# Patient Record
Sex: OTHER/UNKNOWN | Race: WHITE | NOT HISPANIC OR LATINO | Employment: FULL TIME | URBAN - METROPOLITAN AREA
[De-identification: names, ages, dates, MRNs, and addresses within clinical notes are randomized per-mention and may not be internally consistent; named-entity substitution may affect disease eponyms.]

---

## 2023-03-09 ENCOUNTER — TRANSFERRED RECORDS (OUTPATIENT)
Dept: HEALTH INFORMATION MANAGEMENT | Facility: CLINIC | Age: 33
End: 2023-03-09

## 2023-03-09 LAB — PHQ9 SCORE: 1

## 2023-07-13 ENCOUNTER — TRANSFERRED RECORDS (OUTPATIENT)
Dept: HEALTH INFORMATION MANAGEMENT | Facility: CLINIC | Age: 33
End: 2023-07-13

## 2024-02-07 ENCOUNTER — TRANSFERRED RECORDS (OUTPATIENT)
Dept: HEALTH INFORMATION MANAGEMENT | Facility: CLINIC | Age: 34
End: 2024-02-07

## 2024-03-27 ENCOUNTER — OFFICE VISIT (OUTPATIENT)
Dept: FAMILY MEDICINE | Facility: CLINIC | Age: 34
End: 2024-03-27

## 2024-03-27 VITALS
HEIGHT: 67 IN | BODY MASS INDEX: 26.37 KG/M2 | WEIGHT: 168 LBS | HEART RATE: 70 BPM | OXYGEN SATURATION: 97 % | DIASTOLIC BLOOD PRESSURE: 97 MMHG | TEMPERATURE: 98 F | RESPIRATION RATE: 16 BRPM | SYSTOLIC BLOOD PRESSURE: 143 MMHG

## 2024-03-27 DIAGNOSIS — F31.81 BIPOLAR II DISORDER (H): ICD-10-CM

## 2024-03-27 DIAGNOSIS — Z29.81 ENCOUNTER FOR PRE-EXPOSURE PROPHYLAXIS FOR HIV: ICD-10-CM

## 2024-03-27 DIAGNOSIS — F31.81 BIPOLAR II DISORDER (H): Primary | ICD-10-CM

## 2024-03-27 DIAGNOSIS — F90.9 ATTENTION DEFICIT HYPERACTIVITY DISORDER (ADHD), UNSPECIFIED ADHD TYPE: ICD-10-CM

## 2024-03-27 DIAGNOSIS — F90.9 ATTENTION DEFICIT HYPERACTIVITY DISORDER (ADHD), UNSPECIFIED ADHD TYPE: Primary | ICD-10-CM

## 2024-03-27 DIAGNOSIS — Z11.4 SCREENING FOR HUMAN IMMUNODEFICIENCY VIRUS: ICD-10-CM

## 2024-03-27 DIAGNOSIS — Z76.89 ENCOUNTER TO ESTABLISH CARE: ICD-10-CM

## 2024-03-27 LAB
ANION GAP SERPL CALCULATED.3IONS-SCNC: 10 MMOL/L (ref 7–15)
BUN SERPL-MCNC: 11.6 MG/DL (ref 6–20)
CALCIUM SERPL-MCNC: 9.4 MG/DL (ref 8.6–10)
CHLORIDE SERPL-SCNC: 102 MMOL/L (ref 98–107)
CHOLEST SERPL-MCNC: 207 MG/DL
CREAT SERPL-MCNC: 0.99 MG/DL (ref 0.51–1.17)
DEPRECATED HCO3 PLAS-SCNC: 27 MMOL/L (ref 22–29)
EGFRCR SERPLBLD CKD-EPI 2021: NORMAL ML/MIN/{1.73_M2}
FASTING STATUS PATIENT QL REPORTED: ABNORMAL
GLUCOSE SERPL-MCNC: 97 MG/DL (ref 70–99)
HCV AB SERPL QL IA: NONREACTIVE
HDLC SERPL-MCNC: 48 MG/DL
HIV 1+2 AB+HIV1 P24 AG SERPL QL IA: NONREACTIVE
LDLC SERPL CALC-MCNC: 116 MG/DL
NONHDLC SERPL-MCNC: 159 MG/DL
POTASSIUM SERPL-SCNC: 4.4 MMOL/L (ref 3.4–5.3)
SODIUM SERPL-SCNC: 139 MMOL/L (ref 135–145)
T PALLIDUM AB SER QL: REACTIVE
TRIGL SERPL-MCNC: 217 MG/DL

## 2024-03-27 PROCEDURE — 87491 CHLMYD TRACH DNA AMP PROBE: CPT

## 2024-03-27 PROCEDURE — 86592 SYPHILIS TEST NON-TREP QUAL: CPT

## 2024-03-27 PROCEDURE — 36415 COLL VENOUS BLD VENIPUNCTURE: CPT

## 2024-03-27 PROCEDURE — 86780 TREPONEMA PALLIDUM: CPT | Mod: 90

## 2024-03-27 PROCEDURE — 86803 HEPATITIS C AB TEST: CPT

## 2024-03-27 PROCEDURE — 87591 N.GONORRHOEAE DNA AMP PROB: CPT

## 2024-03-27 PROCEDURE — 80048 BASIC METABOLIC PNL TOTAL CA: CPT

## 2024-03-27 PROCEDURE — 80061 LIPID PANEL: CPT

## 2024-03-27 PROCEDURE — 99204 OFFICE O/P NEW MOD 45 MIN: CPT | Mod: GC

## 2024-03-27 PROCEDURE — 87389 HIV-1 AG W/HIV-1&-2 AB AG IA: CPT

## 2024-03-27 PROCEDURE — 99000 SPECIMEN HANDLING OFFICE-LAB: CPT

## 2024-03-27 RX ORDER — METHYLPHENIDATE HYDROCHLORIDE 20 MG/1
20 TABLET ORAL 2 TIMES DAILY
Qty: 28 TABLET | Refills: 0 | Status: CANCELLED | OUTPATIENT
Start: 2024-03-27 | End: 2024-04-10

## 2024-03-27 RX ORDER — METHYLPHENIDATE HYDROCHLORIDE 20 MG/1
20 TABLET ORAL 2 TIMES DAILY
Qty: 10 TABLET | Refills: 0 | Status: SHIPPED | OUTPATIENT
Start: 2024-03-27 | End: 2024-04-19

## 2024-03-27 RX ORDER — LAMOTRIGINE 150 MG/1
150 TABLET ORAL DAILY
Qty: 90 TABLET | Refills: 3 | Status: SHIPPED | OUTPATIENT
Start: 2024-03-27 | End: 2024-04-18

## 2024-03-27 RX ORDER — METHYLPHENIDATE HYDROCHLORIDE 20 MG/1
20 TABLET ORAL 2 TIMES DAILY
Qty: 60 TABLET | Refills: 11 | Status: CANCELLED | OUTPATIENT
Start: 2024-03-27

## 2024-03-27 RX ORDER — EMTRICITABINE AND TENOFOVIR DISOPROXIL FUMARATE 200; 300 MG/1; MG/1
1 TABLET, FILM COATED ORAL DAILY
Qty: 90 TABLET | Refills: 3 | Status: SHIPPED | OUTPATIENT
Start: 2024-03-27 | End: 2024-03-27

## 2024-03-27 NOTE — PROGRESS NOTES
Preceptor Attestation:    I discussed the patient with the resident and evaluated the patient in person. I have verified the content of the note, which accurately reflects my assessment of the patient and the plan of care.   Supervising Physician:  Marcela Nguyen MD

## 2024-03-27 NOTE — PROGRESS NOTES
"  Assessment & Plan     Encounter to establish care  Will obtain vaccine records     Attention deficit hyperactivity disorder (ADHD), unspecified ADHD type  Patient has established ADHD diagnosis with history of neuropsych testing. Obtained a release of records.   - methylphenidate (RITALIN) 20 MG tablet BID    Bipolar II disorder  Stable on Lamotrigine 150mg daily for the past 5-6 years     Screening for human immunodeficiency virus  - Basic metabolic panel  - Hepatitis C antibody  - Treponema Abs w Reflex to RPR and Titer  - Lipid Profile  - HIV Antigen Antibody Combo Cascade  - Chlamydia trachomatis/Neisseria gonorrhoeae by PCR - Clinic Collect    BMI  Estimated body mass index is 26.09 kg/m  as calculated from the following:    Height as of this encounter: 1.709 m (5' 7.28\").    Weight as of this encounter: 76.2 kg (168 lb).     No follow-ups on file. Follow up in 1 year, STI testing in 3 months     Subjective   Herrera is a 33 year old, presenting for the following health issues:  Other (Establish care form [place] for psychiatrist )    HPI   Medical history notable for ADHD established neuropsych testing in the past and Bipolar 2. Both of these have been well controlled and he is here to establish care and start Prep. Herrera has previously been on prep through an online supplier and would like it managed locally. He is new to [place] after living in [place]. He was going to see psychiatry although he has been stable and is comfortable with us managing the above psych medications.     Familial history: notable for diabetes and hypertension throughout, mother had a stroke.     Social history: drinks socially 3 nights a week, no tobacco or nicotine products, no other substance use. Has a male partner, practices receptive anal intercourse, oral intercourse exclusively with his male partner. Cis gender male.         Objective    BP (!) 143/97   Pulse 70   Temp 98  F (36.7  C) (Oral)   Resp 16   Ht 1.709 m " "(5' 7.28\")   Wt 76.2 kg (168 lb)   SpO2 97%   BMI 26.09 kg/m    Body mass index is 26.09 kg/m .  Physical Exam   PHYSICAL EXAM:  GENERAL: Awake, alert, No acute distress.   HEENT: No scleral icterus or conjunctival injection. Oral cavity moist and pink with no ulcers, exudate, or thrush present. No cervical or supraclavicular lymphadenopathy.  SKIN: Warm and dry. No bruises, rashes, or skin lesions.  LUNGS: Normal work of breathing with no use of accessory muscles. Clear breath sounds in all lung fields bilaterally with no wheezes or crackles appreciated.  CARDIAC: RRR. Normal S1 and S2. No murmurs, clicks, or rubs appreciated. No JVD. No peripheral edema.  ABDOMEN: Non-distended. Soft and non-tender throughout with no masses or organomegaly.  NEUROLOGIC: Alert and oriented. Sensation to light touch involving upper and lower extremities intact bilaterally.   EXTREMITIES: No gross deformity or peripheral edema. Appear well-perfused.         Signed Electronically by: MARICARMEN LOPEZ MD  Staffed with Dr. Nguyen   "

## 2024-03-27 NOTE — PATIENT INSTRUCTIONS
PrEP is safe, but some people experience side effects like diarrhea, nausea, headache, fatigue, and stomach pain. These side effects usually go away over time.  Tell your health care provider about any side effects that are severe or do not go away.    Why do I need to take PrEP as prescribed?  You must take PrEP as prescribed for it to work.  If you do not take PrEP as prescribed, there may not be enough medicine in your bloodstream to block the virus.  The right amount of medicine in your bloodstream can stop HIV from taking hold and spreading in your body.      Can I stop using condoms if I take PrEP?    PrEP provides protection from HIV but does not protect against other sexually transmitted diseases (STDs) or prevent pregnancy.  Condoms can help prevent other STDs that can be transmitted through genital fluids, such as gonorrhea and chlamydia.  Condoms are less effective at preventing STDs that can be transmitted through sores or cuts on the skin, like human papillomavirus, genital herpes, and syphilis.

## 2024-03-28 ENCOUNTER — TELEPHONE (OUTPATIENT)
Dept: FAMILY MEDICINE | Facility: CLINIC | Age: 34
End: 2024-03-28

## 2024-03-28 LAB
C TRACH DNA SPEC QL PROBE+SIG AMP: NEGATIVE
C TRACH DNA SPEC QL PROBE+SIG AMP: NEGATIVE
N GONORRHOEA DNA SPEC QL NAA+PROBE: NEGATIVE
N GONORRHOEA DNA SPEC QL NAA+PROBE: NEGATIVE
RPR SER QL: NONREACTIVE

## 2024-03-28 RX ORDER — METHYLPHENIDATE HYDROCHLORIDE 20 MG/1
20 TABLET ORAL DAILY
Qty: 30 TABLET | Refills: 0 | Status: SHIPPED | OUTPATIENT
Start: 2024-05-29 | End: 2024-03-28

## 2024-03-28 RX ORDER — METHYLPHENIDATE HYDROCHLORIDE 20 MG/1
20 TABLET ORAL 2 TIMES DAILY
Qty: 60 TABLET | Refills: 0 | Status: SHIPPED | OUTPATIENT
Start: 2024-05-29 | End: 2024-06-28

## 2024-03-28 RX ORDER — METHYLPHENIDATE HYDROCHLORIDE 20 MG/1
20 TABLET ORAL DAILY
Qty: 30 TABLET | Refills: 0 | Status: SHIPPED | OUTPATIENT
Start: 2024-05-01 | End: 2024-03-28

## 2024-03-28 RX ORDER — METHYLPHENIDATE HYDROCHLORIDE 20 MG/1
20 TABLET ORAL 2 TIMES DAILY
Qty: 60 TABLET | Refills: 0 | Status: SHIPPED | OUTPATIENT
Start: 2024-04-28 | End: 2024-05-28

## 2024-03-28 RX ORDER — METHYLPHENIDATE HYDROCHLORIDE 20 MG/1
20 TABLET ORAL DAILY
Qty: 30 TABLET | Refills: 0 | Status: SHIPPED | OUTPATIENT
Start: 2024-04-01 | End: 2024-03-28

## 2024-03-28 RX ORDER — METHYLPHENIDATE HYDROCHLORIDE 20 MG/1
20 TABLET ORAL 2 TIMES DAILY
Qty: 60 TABLET | Refills: 0 | Status: SHIPPED | OUTPATIENT
Start: 2024-03-28 | End: 2024-04-27

## 2024-03-28 NOTE — TELEPHONE ENCOUNTER
[facility] phone call message- medication clarification/question:    Full Medication Name: methylphenidate   Dose: 20mg for 5 day    Question: they got a new RX but then they had one that said cancel request affective 3/27 so are they not supposed to be giving him the refill they just received yesterday      Pharmacy confirmed as [facility] - [place], [place] - [facility] [address]: Yes    OK to leave a message on voice mail? Yes    Primary language: English      needed? No    Call taken on March 28, 2024 at 9:41 AM by Ran Mckeon

## 2024-03-29 LAB — T PALLIDUM AB SER QL AGGL: REACTIVE

## 2024-04-01 ENCOUNTER — TELEPHONE (OUTPATIENT)
Dept: FAMILY MEDICINE | Facility: CLINIC | Age: 34
End: 2024-04-01

## 2024-04-01 DIAGNOSIS — Z11.3 SCREENING EXAMINATION FOR STI: Primary | ICD-10-CM

## 2024-04-01 RX ORDER — EMTRICITABINE AND TENOFOVIR DISOPROXIL FUMARATE 200; 300 MG/1; MG/1
1 TABLET, FILM COATED ORAL DAILY
Qty: 90 TABLET | Refills: 0 | Status: SHIPPED | OUTPATIENT
Start: 2024-04-01 | End: 2024-04-18

## 2024-04-01 NOTE — TELEPHONE ENCOUNTER
[facility] phone call message- patient requesting results:    Test: Lab    Date of test: ?    Additional Comments: call back with labs    OK to leave a message on voice mail? Yes    Primary language: English      needed? No    Call taken on April 1, 2024 at 8:21 AM by Ran Mckeon

## 2024-04-10 ENCOUNTER — LAB (OUTPATIENT)
Dept: LAB | Facility: CLINIC | Age: 34
End: 2024-04-10
Payer: COMMERCIAL

## 2024-04-10 DIAGNOSIS — Z11.3 SCREENING EXAMINATION FOR STI: ICD-10-CM

## 2024-04-10 PROCEDURE — 86592 SYPHILIS TEST NON-TREP QUAL: CPT

## 2024-04-10 PROCEDURE — 99000 SPECIMEN HANDLING OFFICE-LAB: CPT

## 2024-04-10 PROCEDURE — 86780 TREPONEMA PALLIDUM: CPT | Mod: 90

## 2024-04-10 PROCEDURE — 36415 COLL VENOUS BLD VENIPUNCTURE: CPT

## 2024-04-11 DIAGNOSIS — A52.8 LATE LATENT SYPHILIS: Primary | ICD-10-CM

## 2024-04-11 LAB
RPR SER QL: NONREACTIVE
T PALLIDUM AB SER QL: REACTIVE

## 2024-04-13 LAB — T PALLIDUM AB SER QL AGGL: REACTIVE

## 2024-04-16 ENCOUNTER — ALLIED HEALTH/NURSE VISIT (OUTPATIENT)
Dept: FAMILY MEDICINE | Facility: CLINIC | Age: 34
End: 2024-04-16
Payer: COMMERCIAL

## 2024-04-16 VITALS
HEIGHT: 69 IN | SYSTOLIC BLOOD PRESSURE: 143 MMHG | BODY MASS INDEX: 25 KG/M2 | RESPIRATION RATE: 20 BRPM | TEMPERATURE: 98.3 F | DIASTOLIC BLOOD PRESSURE: 87 MMHG | HEART RATE: 76 BPM | WEIGHT: 168.8 LBS | OXYGEN SATURATION: 98 %

## 2024-04-16 DIAGNOSIS — A52.8 LATE LATENT SYPHILIS: Primary | ICD-10-CM

## 2024-04-16 PROCEDURE — 96372 THER/PROPH/DIAG INJ SC/IM: CPT | Performed by: FAMILY MEDICINE

## 2024-04-16 NOTE — PROGRESS NOTES
Clinic Administered Medication Documentation      Injectable Medication Documentation    Is there an active order (written within the past 365 days, with administrations remaining, not ) in the chart? Yes.     Patient was given Penicillin G Benzathine (Bicillin). Prior to medication administration, verified patient's identity using patient s name and date of birth. Please see MAR and medication order for additional information. Patient instructed to remain in clinic for 15 minutes and report any adverse reaction to staff immediately.    Vial/Syringe: Single dose vial. Was entire vial of medication used? Yes  Was this medication supplied by the patient? No  Is this a medication the patient will need to receive again? Yes. Verified that the patient has refills remaining in their prescription.    Name of provider who requested the medication administration: Dr. Cantrell  Name of provider on site (faculty or community preceptor) at the time of performing the medication administration: Dr. Ramirez    Date of next administration: 24  Date of next office visit with provider to renew medication plan (must be seen annually): n/a      Elli Gonzalez MA      Critical Vital Report    Did patient have a critical vital during today's visit? Yes  Which vital is reporting as critical?: Blood Pressure    I personally notified the following: Provider    Action(s) taken: I left room and notified provider personally

## 2024-04-19 RX ORDER — METHYLPHENIDATE HYDROCHLORIDE 20 MG/1
20 TABLET ORAL 2 TIMES DAILY
Qty: 10 TABLET | Refills: 0 | Status: CANCELLED | OUTPATIENT
Start: 2024-04-19

## 2024-04-23 ENCOUNTER — ALLIED HEALTH/NURSE VISIT (OUTPATIENT)
Dept: FAMILY MEDICINE | Facility: CLINIC | Age: 34
End: 2024-04-23
Payer: COMMERCIAL

## 2024-04-23 VITALS
TEMPERATURE: 98.1 F | OXYGEN SATURATION: 96 % | SYSTOLIC BLOOD PRESSURE: 134 MMHG | DIASTOLIC BLOOD PRESSURE: 88 MMHG | RESPIRATION RATE: 16 BRPM | HEART RATE: 78 BPM

## 2024-04-23 DIAGNOSIS — A52.8 LATE LATENT SYPHILIS: Primary | ICD-10-CM

## 2024-04-23 PROCEDURE — 96372 THER/PROPH/DIAG INJ SC/IM: CPT | Performed by: FAMILY MEDICINE

## 2024-04-23 NOTE — PROGRESS NOTES
Clinic Administered Medication Documentation      Injectable Medication Documentation    Is there an active order (written within the past 365 days, with administrations remaining, not ) in the chart? Yes.     Patient was given 2.4 mill units Penicillin G Benzathine (Bicillin). Prior to medication administration, verified patient's identity using patient s name and date of birth. Please see MAR and medication order for additional information. Patient instructed to remain in clinic for 15 minutes and report any adverse reaction to staff immediately.    Vial/Syringe: Single dose vial. Was entire vial of medication used? Yes  Was this medication supplied by the patient? No  Is this a medication the patient will need to receive again? Yes. Verified that the patient has refills remaining in their prescription.    Name of provider who requested the medication administration: Dr. Cantrell  Name of provider on site (faculty or community preceptor) at the time of performing the medication administration: Dr. Carreon    Date of next administration: 2024  Date of next office visit with provider to renew medication plan (must be seen annually): N/A

## 2024-04-30 ENCOUNTER — ALLIED HEALTH/NURSE VISIT (OUTPATIENT)
Dept: FAMILY MEDICINE | Facility: CLINIC | Age: 34
End: 2024-04-30
Payer: COMMERCIAL

## 2024-04-30 VITALS
RESPIRATION RATE: 20 BRPM | WEIGHT: 164.6 LBS | HEART RATE: 73 BPM | OXYGEN SATURATION: 98 % | DIASTOLIC BLOOD PRESSURE: 80 MMHG | BODY MASS INDEX: 24.38 KG/M2 | TEMPERATURE: 98.3 F | HEIGHT: 69 IN | SYSTOLIC BLOOD PRESSURE: 129 MMHG

## 2024-04-30 DIAGNOSIS — A52.8 LATE LATENT SYPHILIS: Primary | ICD-10-CM

## 2024-04-30 PROCEDURE — 96372 THER/PROPH/DIAG INJ SC/IM: CPT | Performed by: FAMILY MEDICINE

## 2024-04-30 NOTE — PROGRESS NOTES
Clinic Administered Medication Documentation      Injectable Medication Documentation    Is there an active order (written within the past 365 days, with administrations remaining, not ) in the chart? Yes.     Patient was given 2.4 mill units Penicillin G Benzathine (Bicillin). Prior to medication administration, verified patient's identity using patient s name and date of birth. Please see MAR and medication order for additional information. Patient instructed to remain in clinic for 15 minutes and report any adverse reaction to staff immediately.    Vial/Syringe: Single dose vial. Was entire vial of medication used? Yes  Was this medication supplied by the patient? No  Is this a medication the patient will need to receive again? No - not necessary to check for refills remaining.    Name of provider who requested the medication administration: Dr. Cantrell  Name of provider on site (faculty or community preceptor) at the time of performing the medication administration: Dr. Nguyen    Date of next administration: N/A  Date of next office visit with provider to renew medication plan (must be seen annually): N/A

## 2024-05-19 ENCOUNTER — HEALTH MAINTENANCE LETTER (OUTPATIENT)
Age: 34
End: 2024-05-19

## 2024-06-18 DIAGNOSIS — Z29.81 ENCOUNTER FOR PRE-EXPOSURE PROPHYLAXIS FOR HIV: Primary | ICD-10-CM

## 2024-06-20 ENCOUNTER — OFFICE VISIT (OUTPATIENT)
Dept: FAMILY MEDICINE | Facility: CLINIC | Age: 34
End: 2024-06-20
Payer: COMMERCIAL

## 2024-06-20 VITALS
TEMPERATURE: 98.7 F | OXYGEN SATURATION: 97 % | DIASTOLIC BLOOD PRESSURE: 86 MMHG | WEIGHT: 165.4 LBS | HEART RATE: 71 BPM | RESPIRATION RATE: 20 BRPM | SYSTOLIC BLOOD PRESSURE: 135 MMHG | BODY MASS INDEX: 25.07 KG/M2 | HEIGHT: 68 IN

## 2024-06-20 DIAGNOSIS — Z29.81 ENCOUNTER FOR PRE-EXPOSURE PROPHYLAXIS FOR HIV: ICD-10-CM

## 2024-06-20 DIAGNOSIS — F90.9 ATTENTION DEFICIT HYPERACTIVITY DISORDER (ADHD), UNSPECIFIED ADHD TYPE: Primary | ICD-10-CM

## 2024-06-20 PROCEDURE — 86780 TREPONEMA PALLIDUM: CPT

## 2024-06-20 PROCEDURE — 86780 TREPONEMA PALLIDUM: CPT | Mod: 90

## 2024-06-20 PROCEDURE — 99000 SPECIMEN HANDLING OFFICE-LAB: CPT

## 2024-06-20 PROCEDURE — G2211 COMPLEX E/M VISIT ADD ON: HCPCS

## 2024-06-20 PROCEDURE — 87340 HEPATITIS B SURFACE AG IA: CPT

## 2024-06-20 PROCEDURE — 86706 HEP B SURFACE ANTIBODY: CPT

## 2024-06-20 PROCEDURE — 86704 HEP B CORE ANTIBODY TOTAL: CPT

## 2024-06-20 PROCEDURE — 87591 N.GONORRHOEAE DNA AMP PROB: CPT

## 2024-06-20 PROCEDURE — 99214 OFFICE O/P EST MOD 30 MIN: CPT | Mod: GC

## 2024-06-20 PROCEDURE — 86592 SYPHILIS TEST NON-TREP QUAL: CPT

## 2024-06-20 PROCEDURE — 36415 COLL VENOUS BLD VENIPUNCTURE: CPT

## 2024-06-20 PROCEDURE — 87491 CHLMYD TRACH DNA AMP PROBE: CPT

## 2024-06-20 PROCEDURE — 87389 HIV-1 AG W/HIV-1&-2 AB AG IA: CPT

## 2024-06-20 RX ORDER — METHYLPHENIDATE HYDROCHLORIDE 20 MG/1
20 TABLET ORAL 2 TIMES DAILY
Qty: 60 TABLET | Refills: 0 | Status: SHIPPED | OUTPATIENT
Start: 2024-08-19 | End: 2024-09-18

## 2024-06-20 RX ORDER — METHYLPHENIDATE HYDROCHLORIDE 20 MG/1
20 TABLET ORAL 2 TIMES DAILY
Qty: 60 TABLET | Refills: 0 | Status: SHIPPED | OUTPATIENT
Start: 2024-07-19 | End: 2024-08-18

## 2024-06-20 NOTE — PATIENT INSTRUCTIONS
PrEP is safe, but some people experience side effects like diarrhea, nausea, headache, fatigue, and stomach pain. These side effects usually go away over time.  Tell your health care provider about any side effects that are severe or do not go away.    Why do I need to take PrEP as prescribed?  You must take PrEP as prescribed for it to work.  If you do not take PrEP as prescribed, there may not be enough medicine in your bloodstream to block the virus.  The right amount of medicine in your bloodstream can stop HIV from taking hold and spreading in your body.    Can I take PrEP while on birth control?  There are no known interactions between PrEP and hormone-based birth control methods, e.g., the pill, patch, ring, shot, implant, or IUD. It is safe to use both at the same time.    Can I stop using condoms if I take PrEP?    PrEP provides protection from HIV but does not protect against other sexually transmitted diseases (STDs) or prevent pregnancy.  Condoms can help prevent other STDs that can be transmitted through genital fluids, such as gonorrhea and chlamydia.  Condoms are less effective at preventing STDs that can be transmitted through sores or cuts on the skin, like human papillomavirus, genital herpes, and syphilis.

## 2024-06-20 NOTE — PROGRESS NOTES
Assessment & Plan     Encounter for pre-exposure prophylaxis for HIV  Patient here for refill of prep. Labs normal, sending script.   - Treponema Abs w Reflex to RPR and Titer  - HIV Antigen Antibody Combo Cascade  - Hepatitis B surface antigen  - Hepatitis B core antibody  - Hepatitis B Surface Antibody  - Chlamydia trachomatis/Neisseria gonorrhoeae by PCR  - Chlamydia trachomatis/Neisseria gonorrhoeae by PCR  - Chlamydia trachomatis/Neisseria gonorrhoeae by PCR  - Rapid Plasma Reagin w Rflx to TITER  - Treponema pallidum antibody confirm    Attention deficit hyperactivity disorder (ADHD), unspecified ADHD type  Dose has been stable for several years. With me taking over the prescription, I am simplifying the dates of scripts. Script send for July 18th-ish with 2 months. This will allow the script to line up with labs.   - methylphenidate (RITALIN) 20 MG tablet  Dispense: 60 tablet; Refill: 0  - methylphenidate (RITALIN) 20 MG tablet  Dispense: 60 tablet; Refill: 0  Patient is here today to discuss HIV prevention using Truvada as PrEP.  We discussed that Truvada is for people who are at risk of HIV infection through sex or injection drug use.  Common side effects are diarrhea, nausea, headache, fatigue and stomach pain; these side effects usually go away with time.    We discussed the need to screen for HIV infection every 3 months and check creatinine every 6 months and lipid profile every 12 months while taking Truvada.  We also discussed the importance of routine screening for hepatitis C on a yearly basis in men who have sex with men, transgender women, and people who inject drugs.  We recommend screening for sexually transmitted infections every 3 months including a VDRL, GC and chlamydia with urine collection and or swabs of the urethra, cervix, oropharynx and anal verge as appropriate.  We recommend periodic urine pregnancy tests for women who are not using a consistent method of contraception.    HPV  "vaccine is recommended for men who have sex with men and for women aged 26 years and younger.  Hepatitis A and Hepatitis B vaccines are recommended for those who are not previously immunized.  MPox vaccine is recommended for men who have sex with men.  The vaccine is available at the [facility]:   [address] [place], [place] [address]  [phone]    The longitudinal plan of care for the diagnosis(es)/condition(s) as documented were addressed during this visit. Due to the added complexity in care, I will continue to support Herrera in the subsequent management and with ongoing continuity of care.    BMI  Estimated body mass index is 25.52 kg/m  as calculated from the following:    Height as of this encounter: 1.715 m (5' 7.5\").    Weight as of this encounter: 75 kg (165 lb 6.4 oz).     No follow-ups on file.    Subjective   Herrera is a 34 year old, presenting for the following health issues:  Recheck Medication (methylphenidate)      6/20/2024     4:24 PM   Additional Questions   Roomed by SMA Rafia   Accompanied by Self         6/20/2024    Information    services provided? No        HPI     Tolu is a 34 year old male. Medical history of ADHD and on prep. Things have been going well on both medications and he has no questions or concerns.       Objective    /86 (BP Location: Left arm, Patient Position: Sitting, Cuff Size: Adult Regular)   Pulse 71   Temp 98.7  F (37.1  C) (Oral)   Resp 20   Ht 1.715 m (5' 7.5\")   Wt 75 kg (165 lb 6.4 oz)   SpO2 97%   BMI 25.52 kg/m    Body mass index is 25.52 kg/m .  Physical Exam   No physical exam today, medication management visit        Signed Electronically by: MARICARMEN LOPEZ MD  Staffed with Crow Ramirez MD  "

## 2024-06-21 DIAGNOSIS — Z29.81 ENCOUNTER FOR PRE-EXPOSURE PROPHYLAXIS FOR HIV: Primary | ICD-10-CM

## 2024-06-21 LAB
C TRACH DNA SPEC QL PROBE+SIG AMP: NEGATIVE
HBV CORE AB SERPL QL IA: NONREACTIVE
HBV SURFACE AB SERPL IA-ACNC: <3.5 M[IU]/ML
HBV SURFACE AB SERPL IA-ACNC: NONREACTIVE M[IU]/ML
HBV SURFACE AG SERPL QL IA: NONREACTIVE
HIV 1+2 AB+HIV1 P24 AG SERPL QL IA: NONREACTIVE
N GONORRHOEA DNA SPEC QL NAA+PROBE: NEGATIVE
RPR SER QL: NONREACTIVE
T PALLIDUM AB SER QL: REACTIVE

## 2024-06-21 RX ORDER — EMTRICITABINE AND TENOFOVIR DISOPROXIL FUMARATE 200; 300 MG/1; MG/1
1 TABLET, FILM COATED ORAL DAILY
Qty: 90 TABLET | Refills: 0 | Status: SHIPPED | OUTPATIENT
Start: 2024-06-21 | End: 2024-09-14

## 2024-06-24 LAB — T PALLIDUM AB SER QL AGGL: REACTIVE

## 2024-06-24 NOTE — PROGRESS NOTES
Preceptor Attestation:    I discussed the patient with the resident and evaluated the patient in person. I have verified the content of the note, which accurately reflects my assessment of the patient and the plan of care.   Supervising Physician:  Crow Ramirez MD.

## 2024-09-03 DIAGNOSIS — Z11.3 SCREENING EXAMINATION FOR STI: Primary | ICD-10-CM

## 2024-09-13 ENCOUNTER — LAB (OUTPATIENT)
Dept: LAB | Facility: CLINIC | Age: 34
End: 2024-09-13
Payer: COMMERCIAL

## 2024-09-13 DIAGNOSIS — Z11.3 SCREENING EXAMINATION FOR STI: ICD-10-CM

## 2024-09-13 PROCEDURE — 87491 CHLMYD TRACH DNA AMP PROBE: CPT

## 2024-09-13 PROCEDURE — 86592 SYPHILIS TEST NON-TREP QUAL: CPT

## 2024-09-13 PROCEDURE — 36415 COLL VENOUS BLD VENIPUNCTURE: CPT

## 2024-09-13 PROCEDURE — 87591 N.GONORRHOEAE DNA AMP PROB: CPT

## 2024-09-13 PROCEDURE — 87389 HIV-1 AG W/HIV-1&-2 AB AG IA: CPT

## 2024-09-14 DIAGNOSIS — Z29.81 ENCOUNTER FOR PRE-EXPOSURE PROPHYLAXIS FOR HIV: ICD-10-CM

## 2024-09-14 LAB
C TRACH DNA SPEC QL PROBE+SIG AMP: NEGATIVE
HIV 1+2 AB+HIV1 P24 AG SERPL QL IA: NONREACTIVE
N GONORRHOEA DNA SPEC QL NAA+PROBE: NEGATIVE

## 2024-09-14 RX ORDER — EMTRICITABINE AND TENOFOVIR DISOPROXIL FUMARATE 200; 300 MG/1; MG/1
1 TABLET, FILM COATED ORAL DAILY
Qty: 90 TABLET | Refills: 0 | Status: SHIPPED | OUTPATIENT
Start: 2024-09-14

## 2024-09-16 LAB — RPR SER QL: NONREACTIVE

## 2024-09-27 DIAGNOSIS — F31.81 BIPOLAR II DISORDER (H): Primary | ICD-10-CM

## 2024-09-27 RX ORDER — LAMOTRIGINE 150 MG/1
150 TABLET ORAL DAILY
Qty: 90 TABLET | Refills: 3 | Status: SHIPPED | OUTPATIENT
Start: 2024-10-25

## 2024-09-27 RX ORDER — LAMOTRIGINE 100 MG/1
100 TABLET ORAL DAILY
Qty: 14 TABLET | Refills: 0 | Status: SHIPPED | OUTPATIENT
Start: 2024-10-11

## 2024-09-27 RX ORDER — LAMOTRIGINE 25 MG/1
50 TABLET ORAL DAILY
Qty: 28 TABLET | Refills: 0 | Status: SHIPPED | OUTPATIENT
Start: 2024-09-27 | End: 2024-10-11

## 2024-10-21 DIAGNOSIS — Z29.81 ENCOUNTER FOR PRE-EXPOSURE PROPHYLAXIS FOR HIV: ICD-10-CM

## 2024-10-21 DIAGNOSIS — F31.81 BIPOLAR II DISORDER (H): Primary | ICD-10-CM

## 2024-10-21 RX ORDER — LAMOTRIGINE 150 MG/1
150 TABLET ORAL DAILY
Qty: 90 TABLET | Refills: 3 | Status: SHIPPED | OUTPATIENT
Start: 2024-10-21

## 2024-10-21 RX ORDER — EMTRICITABINE AND TENOFOVIR DISOPROXIL FUMARATE 200; 300 MG/1; MG/1
1 TABLET, FILM COATED ORAL DAILY
Qty: 90 TABLET | Refills: 0 | Status: SHIPPED | OUTPATIENT
Start: 2024-10-21 | End: 2025-01-19

## 2024-10-29 DIAGNOSIS — F31.81 BIPOLAR II DISORDER (H): ICD-10-CM

## 2024-10-29 RX ORDER — LAMOTRIGINE 100 MG/1
100 TABLET ORAL DAILY
Qty: 14 TABLET | Refills: 11 | OUTPATIENT
Start: 2024-10-29

## 2024-10-29 NOTE — TELEPHONE ENCOUNTER
Name from pharmacy: LAMOTRIGINE 100 MG  Tablet          Will file in chart as: lamoTRIgine (LAMICTAL) 100 MG tablet     Possible duplicate: Hover to review recent actions on this medication    Sig: Take 1 tablet (100 mg) by mouth daily.    Disp: 14 tablet    Refills: 11    Start: 10/29/2024    Class: E-Prescribe    Non-formulary For: Bipolar II disorder (H)    Last ordered: 1 month ago (9/27/2024) by Sowmya Fernandez MD    Last refill: 10/16/2024    Rx #: [id number]    Anti-Seizure Meds Protocol  Dauunv34/29/2024 12:59 PM   Protocol Details Review Authorizing provider's last note.    Normal ALT or AST on file in past 26 months    Has GFR on file in past 12 months and most recent value is normal    Medication indicated for associated diagnosis          Igor Werner RN, MSN

## 2024-11-12 DIAGNOSIS — F90.9 ATTENTION DEFICIT HYPERACTIVITY DISORDER (ADHD), UNSPECIFIED ADHD TYPE: ICD-10-CM

## 2024-11-12 DIAGNOSIS — F31.81 BIPOLAR II DISORDER (H): Primary | ICD-10-CM

## 2024-11-12 RX ORDER — LAMOTRIGINE 150 MG/1
150 TABLET ORAL DAILY
Qty: 90 TABLET | Refills: 3 | Status: SHIPPED | OUTPATIENT
Start: 2024-12-10 | End: 2024-11-13

## 2024-11-12 RX ORDER — LAMOTRIGINE 100 MG/1
TABLET ORAL
Qty: 21 TABLET | Refills: 0 | Status: SHIPPED | OUTPATIENT
Start: 2024-11-12 | End: 2024-11-13

## 2024-11-12 RX ORDER — METHYLPHENIDATE HYDROCHLORIDE 20 MG/1
20 TABLET ORAL 2 TIMES DAILY
Qty: 60 TABLET | Refills: 0 | Status: SHIPPED | OUTPATIENT
Start: 2024-11-12 | End: 2024-11-13

## 2024-11-13 DIAGNOSIS — F31.81 BIPOLAR II DISORDER (H): ICD-10-CM

## 2024-11-13 DIAGNOSIS — F90.9 ATTENTION DEFICIT HYPERACTIVITY DISORDER (ADHD), UNSPECIFIED ADHD TYPE: ICD-10-CM

## 2024-11-13 RX ORDER — LAMOTRIGINE 100 MG/1
TABLET ORAL
Qty: 21 TABLET | Refills: 0 | Status: SHIPPED | OUTPATIENT
Start: 2024-11-13 | End: 2024-12-10

## 2024-11-13 RX ORDER — METHYLPHENIDATE HYDROCHLORIDE 20 MG/1
20 TABLET ORAL 2 TIMES DAILY
Qty: 60 TABLET | Refills: 0 | Status: SHIPPED | OUTPATIENT
Start: 2024-11-13

## 2024-11-13 RX ORDER — LAMOTRIGINE 150 MG/1
150 TABLET ORAL DAILY
Qty: 90 TABLET | Refills: 3 | Status: SHIPPED | OUTPATIENT
Start: 2024-12-10

## 2025-01-15 DIAGNOSIS — Z29.81 ENCOUNTER FOR PRE-EXPOSURE PROPHYLAXIS FOR HIV: ICD-10-CM

## 2025-01-15 DIAGNOSIS — F90.9 ATTENTION DEFICIT HYPERACTIVITY DISORDER (ADHD), UNSPECIFIED ADHD TYPE: Primary | ICD-10-CM

## 2025-01-15 RX ORDER — EMTRICITABINE AND TENOFOVIR DISOPROXIL FUMARATE 200; 300 MG/1; MG/1
1 TABLET, FILM COATED ORAL DAILY
Qty: 90 TABLET | Refills: 0 | Status: SHIPPED | OUTPATIENT
Start: 2025-01-15 | End: 2025-01-16

## 2025-01-15 RX ORDER — METHYLPHENIDATE HYDROCHLORIDE 20 MG/1
20 TABLET ORAL 2 TIMES DAILY
Qty: 60 TABLET | Refills: 0 | Status: SHIPPED | OUTPATIENT
Start: 2025-03-16 | End: 2025-04-15

## 2025-01-15 RX ORDER — METHYLPHENIDATE HYDROCHLORIDE 20 MG/1
20 TABLET ORAL 2 TIMES DAILY
Qty: 60 TABLET | Refills: 0 | Status: SHIPPED | OUTPATIENT
Start: 2025-02-14 | End: 2025-03-16

## 2025-01-15 RX ORDER — METHYLPHENIDATE HYDROCHLORIDE 20 MG/1
20 TABLET ORAL 2 TIMES DAILY
Qty: 60 TABLET | Refills: 0 | Status: SHIPPED | OUTPATIENT
Start: 2025-01-15 | End: 2025-02-14

## 2025-01-15 NOTE — PROGRESS NOTES
Patient messaged me that he is out of medications. PDMP reviewed and sent refills on ritalin for 3 months and 3 month prep supply. I have appointment with the patient on 1/29. I reached out to the patient and he is agreeable to this plan.     Chidi Cantrell MD

## 2025-01-16 DIAGNOSIS — Z29.81 ENCOUNTER FOR PRE-EXPOSURE PROPHYLAXIS FOR HIV: ICD-10-CM

## 2025-01-16 RX ORDER — EMTRICITABINE AND TENOFOVIR DISOPROXIL FUMARATE 200; 300 MG/1; MG/1
1 TABLET, FILM COATED ORAL DAILY
Qty: 90 TABLET | Refills: 0 | Status: SHIPPED | OUTPATIENT
Start: 2025-01-16

## 2025-01-29 ENCOUNTER — OFFICE VISIT (OUTPATIENT)
Dept: FAMILY MEDICINE | Facility: CLINIC | Age: 35
End: 2025-01-29
Payer: COMMERCIAL

## 2025-01-29 VITALS
SYSTOLIC BLOOD PRESSURE: 131 MMHG | BODY MASS INDEX: 25.62 KG/M2 | TEMPERATURE: 97.8 F | OXYGEN SATURATION: 97 % | WEIGHT: 166 LBS | DIASTOLIC BLOOD PRESSURE: 84 MMHG | HEART RATE: 56 BPM | RESPIRATION RATE: 16 BRPM

## 2025-01-29 DIAGNOSIS — Z29.81 ENCOUNTER FOR PRE-EXPOSURE PROPHYLAXIS FOR HIV: Primary | ICD-10-CM

## 2025-01-29 DIAGNOSIS — E78.5 HYPERLIPIDEMIA LDL GOAL <100: ICD-10-CM

## 2025-01-29 DIAGNOSIS — Z00.00 HEALTHCARE MAINTENANCE: ICD-10-CM

## 2025-01-29 DIAGNOSIS — Z70.8 COUNSELING FOR SEXUALLY TRANSMITTED DISEASE: ICD-10-CM

## 2025-01-29 DIAGNOSIS — Z00.00 ROUTINE GENERAL MEDICAL EXAMINATION AT A HEALTH CARE FACILITY: ICD-10-CM

## 2025-01-29 LAB
ALBUMIN SERPL BCG-MCNC: 4.9 G/DL (ref 3.5–5.2)
ALP SERPL-CCNC: 85 U/L (ref 40–150)
ALT SERPL W P-5'-P-CCNC: 54 U/L (ref 0–70)
ANION GAP SERPL CALCULATED.3IONS-SCNC: 8 MMOL/L (ref 7–15)
AST SERPL W P-5'-P-CCNC: 35 U/L (ref 0–45)
BILIRUB SERPL-MCNC: 0.5 MG/DL
BUN SERPL-MCNC: 18.7 MG/DL (ref 6–20)
CALCIUM SERPL-MCNC: 10.2 MG/DL (ref 8.8–10.4)
CHLORIDE SERPL-SCNC: 101 MMOL/L (ref 98–107)
CHOLEST SERPL-MCNC: 219 MG/DL
CREAT SERPL-MCNC: 1.03 MG/DL (ref 0.51–1.17)
EGFRCR SERPLBLD CKD-EPI 2021: ABNORMAL ML/MIN/{1.73_M2}
FASTING STATUS PATIENT QL REPORTED: ABNORMAL
FASTING STATUS PATIENT QL REPORTED: ABNORMAL
GLUCOSE SERPL-MCNC: 100 MG/DL (ref 70–99)
HBV CORE AB SERPL QL IA: NONREACTIVE
HBV SURFACE AB SERPL IA-ACNC: <3.5 M[IU]/ML
HBV SURFACE AB SERPL IA-ACNC: NONREACTIVE M[IU]/ML
HBV SURFACE AG SERPL QL IA: NONREACTIVE
HCO3 SERPL-SCNC: 29 MMOL/L (ref 22–29)
HCV AB SERPL QL IA: NONREACTIVE
HDLC SERPL-MCNC: 52 MG/DL
HIV 1+2 AB+HIV1 P24 AG SERPL QL IA: NONREACTIVE
LDLC SERPL CALC-MCNC: 121 MG/DL
NONHDLC SERPL-MCNC: 167 MG/DL
POTASSIUM SERPL-SCNC: 4.7 MMOL/L (ref 3.4–5.3)
PROT SERPL-MCNC: 7.6 G/DL (ref 6.4–8.3)
SODIUM SERPL-SCNC: 138 MMOL/L (ref 135–145)
TRIGL SERPL-MCNC: 231 MG/DL

## 2025-01-29 PROCEDURE — 99395 PREV VISIT EST AGE 18-39: CPT | Mod: 25

## 2025-01-29 PROCEDURE — 86706 HEP B SURFACE ANTIBODY: CPT

## 2025-01-29 PROCEDURE — 99214 OFFICE O/P EST MOD 30 MIN: CPT | Mod: 25

## 2025-01-29 PROCEDURE — 86592 SYPHILIS TEST NON-TREP QUAL: CPT

## 2025-01-29 PROCEDURE — 80053 COMPREHEN METABOLIC PANEL: CPT

## 2025-01-29 PROCEDURE — 36415 COLL VENOUS BLD VENIPUNCTURE: CPT

## 2025-01-29 PROCEDURE — 87389 HIV-1 AG W/HIV-1&-2 AB AG IA: CPT

## 2025-01-29 PROCEDURE — 86803 HEPATITIS C AB TEST: CPT

## 2025-01-29 PROCEDURE — 90656 IIV3 VACC NO PRSV 0.5 ML IM: CPT

## 2025-01-29 PROCEDURE — 90471 IMMUNIZATION ADMIN: CPT

## 2025-01-29 PROCEDURE — 90651 9VHPV VACCINE 2/3 DOSE IM: CPT

## 2025-01-29 PROCEDURE — 87340 HEPATITIS B SURFACE AG IA: CPT

## 2025-01-29 PROCEDURE — 80061 LIPID PANEL: CPT

## 2025-01-29 PROCEDURE — 90715 TDAP VACCINE 7 YRS/> IM: CPT

## 2025-01-29 PROCEDURE — 86704 HEP B CORE ANTIBODY TOTAL: CPT

## 2025-01-29 PROCEDURE — 87591 N.GONORRHOEAE DNA AMP PROB: CPT

## 2025-01-29 PROCEDURE — 87491 CHLMYD TRACH DNA AMP PROBE: CPT

## 2025-01-29 PROCEDURE — 90472 IMMUNIZATION ADMIN EACH ADD: CPT

## 2025-01-29 RX ORDER — DOXYCYCLINE 100 MG/1
200 CAPSULE ORAL PRN
Qty: 20 CAPSULE | Refills: 0 | Status: SHIPPED | OUTPATIENT
Start: 2025-01-29

## 2025-01-29 SDOH — HEALTH STABILITY: PHYSICAL HEALTH: ON AVERAGE, HOW MANY DAYS PER WEEK DO YOU ENGAGE IN MODERATE TO STRENUOUS EXERCISE (LIKE A BRISK WALK)?: 4 DAYS

## 2025-01-29 ASSESSMENT — SOCIAL DETERMINANTS OF HEALTH (SDOH): HOW OFTEN DO YOU GET TOGETHER WITH FRIENDS OR RELATIVES?: ONCE A WEEK

## 2025-01-29 NOTE — PROGRESS NOTES
Preceptor Attestation:    I discussed the patient with the resident and evaluated the patient in person. I have verified the content of the note, which accurately reflects my assessment of the patient and the plan of care.   Supervising Physician:  Joshua Jewell DO.

## 2025-01-29 NOTE — PROGRESS NOTES
"Preventive Care Visit  [facility]  Chidi Cantrell MD, Family Medicine  Jan 29, 2025    SUBJECTIVE:   Herrera is a 34 year old, presenting for the following:  Physical (Physical )    HPI  Patient is a 34-year-old male here for annual checkup.  Patient takes Ritalin and lamotrigine and Truvada all of these have been stable.  He would like to discuss the possibility of doxy pep in his cholesterol.    Social History     Tobacco Use    Smoking status: Never    Smokeless tobacco: Never   Substance Use Topics    Alcohol use: Not on file         1/29/2025    10:09 AM   Alcohol Use   Prescreen: >3 drinks/day or >7 drinks/week? No     Last PSA: No results found for: \"PSA\"    Reviewed and updated as needed this visit by clinical staff   Tobacco  Allergies  Meds            Reviewed and updated as needed this visit by Provider                    OBJECTIVE:   /84   Pulse 56   Temp 97.8  F (36.6  C) (Oral)   Resp 16   Wt 75.3 kg (166 lb)   SpO2 97%   BMI 25.62 kg/m     Estimated body mass index is 25.62 kg/m  as calculated from the following:    Height as of 6/20/24: 1.715 m (5' 7.5\").    Weight as of this encounter: 75.3 kg (166 lb).  Physical Exam  GENERAL: Awake, alert, No acute distress.   HEENT: No scleral icterus or conjunctival injection. Oral cavity moist and pink with no ulcers, exudate, or thrush present. No cervical or supraclavicular lymphadenopathy.  SKIN: Warm and dry. No bruises, rashes, or skin lesions.  LUNGS: Normal work of breathing with no use of accessory muscles. Clear breath sounds in all lung fields bilaterally with no wheezes or crackles appreciated.  CARDIAC: RRR. Normal S1 and S2. No murmurs, clicks, or rubs appreciated. No JVD. No peripheral edema.  ABDOMEN: Non-distended. Soft and non-tender throughout with no masses or organomegaly.  NEUROLOGIC: Alert and oriented. Sensation to light touch involving upper and lower extremities intact bilaterally. "   EXTREMITIES: No gross deformity or peripheral edema. Appear well-perfused.       ASSESSMENT/PLAN:   Encounter for pre-exposure prophylaxis for HIV  Prep labs normal truvada had already been refilled on 1/16, this was the annual prep visit. Things have been going well with no questions or concerns.  Of note patient did have a positive syphilis antibody last year and was treated for latent syphilis infection.  Titers remain negative.  - Chlamydia trachomatis/Neisseria gonorrhoeae by PCR  - Chlamydia trachomatis/Neisseria gonorrhoeae by PCR  - Chlamydia trachomatis/Neisseria gonorrhoeae by PCR  - Chlamydia trachomatis/Neisseria gonorrhoeae by PCR  - Chlamydia trachomatis/Neisseria gonorrhoeae by PCR  - Chlamydia trachomatis/Neisseria gonorrhoeae by PCR  - Comprehensive metabolic panel  - Hepatitis B core antibody  - Hepatitis B Surface Antibody  - Hepatitis B surface antigen  - Hepatitis C Screen Reflex to HCV RNA Quant and Genotype  - HIV Antigen Antibody Combo Cascade  - Rapid Plasma Reagin w Rflx to TITER  - Lipid panel reflex to direct LDL Fasting  - Comprehensive metabolic panel  - Hepatitis B core antibody  - Hepatitis B Surface Antibody  - Hepatitis B surface antigen  - Hepatitis C Screen Reflex to HCV RNA Quant and Genotype  - HIV Antigen Antibody Combo Cascade  - Rapid Plasma Reagin w Rflx to TITER  - Lipid panel reflex to direct LDL Fasting  [facility]    Healthcare maintenance  Counseling for sexually transmitted disease  Patient states that he and his partner are in an open relationship with approximately 1 additional partner each month.  He is wondering if Doxy prophylaxis would be helpful for him.  I think that this is reasonable.  Discussed safe sexual practices and gave him 10 doses of 200 mg of doxycycline per CDC guidelines to take within 72 hours of unprotected sexual contact.  Patient agreeable to plan no additional questions or concerns.  - doxycycline hyclate (VIBRAMYCIN)  "100 MG capsule  Dispense: 20 capsule; Refill: 0    Hyperlipidemia LDL goal <100  Patient here to also discuss cholesterol levels, he states that his cholesterol had been high when he originally moved here although at this time we reviewed that to be related to the move and over the past year he has worked on making good healthy lifestyle changes.  He is on Truvada which should help the lipid profile some.  This is likely familial hypercholesterolemia.  When his cholesterol was checked at this visit his LDL was 121 this is slightly up from 116 10 months ago.  HDL looks fine at 52.  Decision was made to start atorvastatin 20 mg daily to get him under 100.  Patient agreed with this plan and will recheck lipids likely with next prep labs in 3 months.  - atorvastatin (LIPITOR) 20 MG tablet  Dispense: 90 tablet; Refill: 3    Routine general medical examination at a health care facility  Regarding vaccines we completed flu and HPV vaccines today.  Patient will come back with prep labs and free 3 months to complete HPV series.  Also did Tdap.  Ordered hepatitis B vaccines to be completed again with prep lab visits.  Patient understands this and will make nursing appointments with the labs.    The longitudinal plan of care for the diagnosis(es)/condition(s) as documented were addressed during this visit. Due to the added complexity in care, I will continue to support Herrera in the subsequent management and with ongoing continuity of care.      Counseling  Reviewed preventive health counseling, as reflected in patient instructions       Regular exercise       Healthy diet/nutrition       Immunizations       Safe sex practices/STD prevention       PrEP retrovirus therapy for HIV prevention     BMI  Estimated body mass index is 25.62 kg/m  as calculated from the following:    Height as of 6/20/24: 1.715 m (5' 7.5\").    Weight as of this encounter: 75.3 kg (166 lb).     He reports that he has never smoked. He has never used " smokeless tobacco.           Lab on 09/13/2024   Component Date Value Ref Range Status    HIV Antigen Antibody Combo 09/13/2024 Nonreactive  Nonreactive Final    Negative HIV-1 p24 antigen and HIV-1/2 antibody screening test results usually indicate the absence of HIV-1 and HIV-2 infection. However, such negative results do not rule-out acute HIV infection.  If acute HIV-1 or HIV-2 infection is suspected, detection of HIV-1 or HIV-2 RNA  is recommended.     Rapid Plasma Reagin 09/13/2024 Nonreactive  Nonreactive Final    Chlamydia Trachomatis 09/13/2024 Negative  Negative Final    Negative for C. trachomatis rRNA by transcription mediated amplification.   A negative result by transcription mediated amplification does not preclude the presence of infection because results are dependent on proper and adequate collection, absence of inhibitors and sufficient rRNA to be detected.    Neisseria gonorrhoeae 09/13/2024 Negative  Negative Final    Negative for N. gonorrhoeae rRNA by transcription mediated amplification. A negative result by transcription mediated amplification does not preclude the presence of C. trachomatis infection because results are dependent on proper and adequate collection, absence of inhibitors and sufficient rRNA to be detected.    Chlamydia Trachomatis 09/13/2024 Negative  Negative Final    Negative for C. trachomatis rRNA by transcription mediated amplification.   A negative result by transcription mediated amplification does not preclude the presence of infection because results are dependent on proper and adequate collection, absence of inhibitors and sufficient rRNA to be detected.    Neisseria gonorrhoeae 09/13/2024 Negative  Negative Final    Negative for N. gonorrhoeae rRNA by transcription mediated amplification. A negative result by transcription mediated amplification does not preclude the presence of C. trachomatis infection because results are dependent on proper and adequate  collection, absence of inhibitors and sufficient rRNA to be detected.    Chlamydia Trachomatis 09/13/2024 Negative  Negative Final    Negative for C. trachomatis rRNA by transcription mediated amplification.   A negative result by transcription mediated amplification does not preclude the presence of infection because results are dependent on proper and adequate collection, absence of inhibitors and sufficient rRNA to be detected.    Neisseria gonorrhoeae 09/13/2024 Negative  Negative Final    Negative for N. gonorrhoeae rRNA by transcription mediated amplification. A negative result by transcription mediated amplification does not preclude the presence of C. trachomatis infection because results are dependent on proper and adequate collection, absence of inhibitors and sufficient rRNA to be detected.         Signed Electronically by: Chidi Cantrell MD  Staffed with Joshua Jewell DO

## 2025-01-30 LAB
C TRACH DNA SPEC QL PROBE+SIG AMP: NEGATIVE
N GONORRHOEA DNA SPEC QL NAA+PROBE: NEGATIVE
RPR SER QL: NONREACTIVE
SPECIMEN TYPE: NORMAL

## 2025-02-01 RX ORDER — ATORVASTATIN CALCIUM 20 MG/1
20 TABLET, FILM COATED ORAL DAILY
Qty: 90 TABLET | Refills: 3 | Status: SHIPPED | OUTPATIENT
Start: 2025-02-01

## 2025-02-05 NOTE — PATIENT INSTRUCTIONS
Patient Education   Preventive Care Advice   This is general advice given by our system to help you stay healthy. However, your care team may have specific advice just for you. Please talk to your care team about your preventive care needs.  Nutrition  Eat 5 or more servings of fruits and vegetables each day.  Try wheat bread, brown rice and whole grain pasta (instead of white bread, rice, and pasta).  Get enough calcium and vitamin D. Check the label on foods and aim for 100% of the RDA (recommended daily allowance).  Lifestyle  Exercise at least 150 minutes each week  (30 minutes a day, 5 days a week).  Do muscle strengthening activities 2 days a week. These help control your weight and prevent disease.  No smoking.  Wear sunscreen to prevent skin cancer.  Have a dental exam and cleaning every 6 months.  Yearly exams  See your health care team every year to talk about:  Any changes in your health.  Any medicines your care team has prescribed.  Preventive care, family planning, and ways to prevent chronic diseases.  Shots (vaccines)   HPV shots (up to age 26), if you've never had them before.  Hepatitis B shots (up to age 59), if you've never had them before.  COVID-19 shot: Get this shot when it's due.  Flu shot: Get a flu shot every year.  Tetanus shot: Get a tetanus shot every 10 years.  Pneumococcal, hepatitis A, and RSV shots: Ask your care team if you need these based on your risk.  Shingles shot (for age 50 and up)  General health tests  Diabetes screening:  Starting at age 35, Get screened for diabetes at least every 3 years.  If you are younger than age 35, ask your care team if you should be screened for diabetes.  Cholesterol test: At age 39, start having a cholesterol test every 5 years, or more often if advised.  Bone density scan (DEXA): At age 50, ask your care team if you should have this scan for osteoporosis (brittle bones).  Hepatitis C: Get tested at least once in your life.  STIs (sexually  transmitted infections)  Before age 24: Ask your care team if you should be screened for STIs.  After age 24: Get screened for STIs if you're at risk. You are at risk for STIs (including HIV) if:  You are sexually active with more than one person.  You don't use condoms every time.  You or a partner was diagnosed with a sexually transmitted infection.  If you are at risk for HIV, ask about PrEP medicine to prevent HIV.  Get tested for HIV at least once in your life, whether you are at risk for HIV or not.  Cancer screening tests  Cervical cancer screening: If you have a cervix, begin getting regular cervical cancer screening tests starting at age 21.  Breast cancer scan (mammogram): If you've ever had breasts, begin having regular mammograms starting at age 40. This is a scan to check for breast cancer.  Colon cancer screening: It is important to start screening for colon cancer at age 45.  Have a colonoscopy test every 10 years (or more often if you're at risk) Or, ask your provider about stool tests like a FIT test every year or Cologuard test every 3 years.  To learn more about your testing options, visit:   .  For help making a decision, visit:   https://bit.ly/bv28613.  Prostate cancer screening test: If you have a prostate, ask your care team if a prostate cancer screening test (PSA) at age 55 is right for you.  Lung cancer screening: If you are a current or former smoker ages 50 to 80, ask your care team if ongoing lung cancer screenings are right for you.  For informational purposes only. Not to replace the advice of your health care provider. Copyright   2023 [facility]. All rights reserved. Clinically reviewed by the [facility] Transitions Program. carpooling.com [id number] - REV 01/24.  Safer Sex: Care Instructions  Overview  Safer sex is a way to reduce your risk of getting a sexually transmitted infection (STI). It can also help prevent pregnancy.  Several products can help you practice  safer sex and reduce your chance of STIs. One of the best is a condom. There are internal and external condoms. You can use a special rubber sheet (dental dam) for protection during oral sex. Disposable gloves can keep your hands from touching blood, semen, or other body fluids that can carry infections.  Remember that birth control methods such as diaphragms, IUDs, foams, and birth control pills do not stop you from getting STIs.  Follow-up care is a key part of your treatment and safety. Be sure to make and go to all appointments, and call your doctor if you are having problems. It's also a good idea to know your test results and keep a list of the medicines you take.  How can you care for yourself at home?  Think about getting vaccinated to help prevent hepatitis A, hepatitis B, and human papillomavirus (HPV). They can be spread through sex.  Use a condom every time you have sex. Use an external condom, which goes on the penis. Or use an internal condom, which goes into the vagina or anus.  Make sure you use the right size external condom. A condom that's too small can break easily. A condom that's too big can slip off during sex.  Use a new condom each time you have sex. Be careful not to poke a hole in the condom when you open the wrapper.  Don't use an internal condom and an external condom at the same time.  Never use petroleum jelly (such as Vaseline), grease, hand lotion, baby oil, or anything with oil in it. These products can make holes in the condom.  After intercourse, hold the edge of the condom as you remove it. This will help keep semen from spilling out of the condom.  Do not have sex with anyone who has symptoms of an STI, such as sores on the genitals or mouth.  Do not drink a lot of alcohol or use drugs before sex.  Limit your sex partners. Sex with one partner who has sex only with you can reduce your risk of getting an STI.  Don't share sex toys. But if you do share them, use a condom and clean  "the sex toys between each use.  Talk to any partners before you have sex. Talk about what you feel comfortable with and whether you have any boundaries with sex. And find out if your partner or partners may be at risk for any STI. Keep in mind that a person may be able to spread an STI even if they do not have symptoms. You and any partners may want to get tested for STIs.  Where can you learn more?  Go to https://www.[employer].net/patiented  Enter B608 in the search box to learn more about \"Safer Sex: Care Instructions.\"  Current as of: April 30, 2024  Content Version: 14.3    2024 [employer].   Care instructions adapted under license by your healthcare professional. If you have questions about a medical condition or this instruction, always ask your healthcare professional. [employer] disclaims any warranty or liability for your use of this information.       "

## 2025-03-27 DIAGNOSIS — Z29.81 ENCOUNTER FOR PRE-EXPOSURE PROPHYLAXIS FOR HIV: ICD-10-CM

## 2025-03-27 RX ORDER — EMTRICITABINE AND TENOFOVIR DISOPROXIL FUMARATE 200; 300 MG/1; MG/1
1 TABLET, FILM COATED ORAL DAILY
Qty: 90 TABLET | Refills: 0 | Status: SHIPPED | OUTPATIENT
Start: 2025-03-27

## 2025-03-27 NOTE — TELEPHONE ENCOUNTER
Refill provided after reviewing EMR:    Pt completed labs in January 2025, including a nonreactive HIV test and normal CMP. Refill provided.    ROLA PETERSON MD

## 2025-03-27 NOTE — TELEPHONE ENCOUNTER
emtricitabine-tenofovir (TRUVADA) 200-300 MG per tablet         Sig: Take 1 tablet by mouth daily.    Disp: 90 tablet    Refills: 0    Start: 3/27/2025    Class: E-Prescribe    For: Encounter for pre-exposure prophylaxis for HIV    Last ordered: 2 months ago (1/16/2025) by Chidi Cantrell MD    Antiretroviral Pre-exposure prophylaxis (PrEP) [id number]/27/2025 09:15 AM   Protocol Details Has GFR on file in past 12 months and most recent value is normal    Always fail criteria    The patients has had an HIV test in the past 3 months    The patient is age 19-49 years and has a Hepatitis C Antibody (lab) on file in the past 12 months    Medication is active on med list and the sig matches. RN to manually verify dose and sig if red X/fail.    Most Recent (12 month) or future (90 days) visit with authorizing provider s specialty    Medication indicated for associated diagnosis    Patient is age 18 or older   ANTIRETROVIRALS Failed   Protocol Details CBC on file the past 12 months    Has GFR on file in past 12 months and most recent value is normal    The authorizing provider's specialty is infectious disease    Medication is indicated for HIV infection    T-Cell Subset on file in the past 12 months    Most recent HIV measures less than 50 copies/ml            Igor Werner, RN, MSN

## 2025-04-09 DIAGNOSIS — F90.9 ATTENTION DEFICIT HYPERACTIVITY DISORDER (ADHD), UNSPECIFIED ADHD TYPE: ICD-10-CM

## 2025-04-09 RX ORDER — METHYLPHENIDATE HYDROCHLORIDE 20 MG/1
20 TABLET ORAL 2 TIMES DAILY
Qty: 12 TABLET | Refills: 0 | Status: SHIPPED | OUTPATIENT
Start: 2025-04-09

## 2025-04-15 ENCOUNTER — OFFICE VISIT (OUTPATIENT)
Dept: FAMILY MEDICINE | Facility: CLINIC | Age: 35
End: 2025-04-15
Payer: COMMERCIAL

## 2025-04-15 ENCOUNTER — ANCILLARY PROCEDURE (OUTPATIENT)
Dept: GENERAL RADIOLOGY | Facility: CLINIC | Age: 35
End: 2025-04-15
Payer: COMMERCIAL

## 2025-04-15 VITALS
RESPIRATION RATE: 16 BRPM | BODY MASS INDEX: 25.15 KG/M2 | SYSTOLIC BLOOD PRESSURE: 132 MMHG | HEART RATE: 82 BPM | OXYGEN SATURATION: 95 % | DIASTOLIC BLOOD PRESSURE: 86 MMHG | TEMPERATURE: 98.1 F | WEIGHT: 163 LBS

## 2025-04-15 DIAGNOSIS — Z29.81 ENCOUNTER FOR PRE-EXPOSURE PROPHYLAXIS FOR HIV: Primary | ICD-10-CM

## 2025-04-15 DIAGNOSIS — M25.521 ELBOW PAIN, RIGHT: ICD-10-CM

## 2025-04-15 DIAGNOSIS — E78.5 HYPERLIPIDEMIA LDL GOAL <100: ICD-10-CM

## 2025-04-15 DIAGNOSIS — F90.9 ATTENTION DEFICIT HYPERACTIVITY DISORDER (ADHD), UNSPECIFIED ADHD TYPE: ICD-10-CM

## 2025-04-15 LAB
ANION GAP SERPL CALCULATED.3IONS-SCNC: 9 MMOL/L (ref 7–15)
BUN SERPL-MCNC: 15.4 MG/DL (ref 6–20)
CALCIUM SERPL-MCNC: 9.8 MG/DL (ref 8.8–10.4)
CHLORIDE SERPL-SCNC: 102 MMOL/L (ref 98–107)
CHOLEST SERPL-MCNC: 195 MG/DL
CREAT SERPL-MCNC: 1.07 MG/DL (ref 0.51–1.17)
EGFRCR SERPLBLD CKD-EPI 2021: ABNORMAL ML/MIN/{1.73_M2}
FASTING STATUS PATIENT QL REPORTED: ABNORMAL
FASTING STATUS PATIENT QL REPORTED: ABNORMAL
GLUCOSE SERPL-MCNC: 100 MG/DL (ref 70–99)
HBV CORE AB SERPL QL IA: NONREACTIVE
HBV SURFACE AG SERPL QL IA: NONREACTIVE
HCO3 SERPL-SCNC: 27 MMOL/L (ref 22–29)
HCV AB SERPL QL IA: NONREACTIVE
HDLC SERPL-MCNC: 49 MG/DL
HIV 1+2 AB+HIV1 P24 AG SERPL QL IA: NONREACTIVE
LDLC SERPL CALC-MCNC: 80 MG/DL
NONHDLC SERPL-MCNC: 146 MG/DL
POTASSIUM SERPL-SCNC: 4.6 MMOL/L (ref 3.4–5.3)
SODIUM SERPL-SCNC: 138 MMOL/L (ref 135–145)
TRIGL SERPL-MCNC: 328 MG/DL

## 2025-04-15 PROCEDURE — 73070 X-RAY EXAM OF ELBOW: CPT | Mod: TC | Performed by: RADIOLOGY

## 2025-04-15 RX ORDER — METHYLPHENIDATE HYDROCHLORIDE 20 MG/1
20 TABLET ORAL 2 TIMES DAILY
Qty: 60 TABLET | Refills: 0 | Status: SHIPPED | OUTPATIENT
Start: 2025-05-15 | End: 2025-06-14

## 2025-04-15 RX ORDER — METHYLPHENIDATE HYDROCHLORIDE 20 MG/1
20 TABLET ORAL 2 TIMES DAILY
Qty: 60 TABLET | Refills: 0 | Status: SHIPPED | OUTPATIENT
Start: 2025-06-14 | End: 2025-07-14

## 2025-04-15 RX ORDER — METHYLPHENIDATE HYDROCHLORIDE 20 MG/1
20 TABLET ORAL 2 TIMES DAILY
Qty: 60 TABLET | Refills: 0 | Status: SHIPPED | OUTPATIENT
Start: 2025-04-15 | End: 2025-05-15

## 2025-04-15 NOTE — PROGRESS NOTES
"  Assessment & Plan     Encounter for pre-exposure prophylaxis for HIV  Yearly prep check, things otherwise stable. Will check labs and refill prep.   - Chlamydia trachomatis/Neisseria gonorrhoeae by PCR  - Chlamydia trachomatis/Neisseria gonorrhoeae by PCR  - Chlamydia trachomatis/Neisseria gonorrhoeae by PCR  - Basic metabolic panel  - Hepatitis C Screen Reflex to HCV RNA Quant and Genotype  - Hepatitis B surface antigen  - Hepatitis B core antibody  - Rapid Plasma Reagin w Rflx to TITER  - HIV Antigen Antibody Combo Cascade  - Lipid panel reflex to direct LDL Fasting    Hyperlipidemia LDL goal <100  Part of prep labs and previously tried atorvastatin with notable muscle pain and soreness. Chronic condition not at goal although he has made some lifestyle changes. Will see where this is at. Patient interested in trying a different statin before exploring other options.   - Lipid panel reflex to direct LDL Fasting    Attention deficit hyperactivity disorder (ADHD), unspecified ADHD type  Stable, PDMP reviewed will refill.   - methylphenidate (RITALIN) 20 MG tablet  Dispense: 60 tablet; Refill: 0  - methylphenidate (RITALIN) 20 MG tablet  Dispense: 60 tablet; Refill: 0  - methylphenidate (RITALIN) 20 MG tablet  Dispense: 60 tablet; Refill: 0    Elbow pain, right  Patient fell on elbow. MSK exam showing tenderness along Ulnar nerve. No numbness or soreness. Will get an initial xray and then consider sports met referral if needed.   - XR Elbow Right 2 Views    The longitudinal plan of care for the diagnosis(es)/condition(s) as documented were addressed during this visit. Due to the added complexity in care, I will continue to support Herrera in the subsequent management and with ongoing continuity of care.      BMI  Estimated body mass index is 25.15 kg/m  as calculated from the following:    Height as of 6/20/24: 1.715 m (5' 7.5\").    Weight as of this encounter: 73.9 kg (163 lb).     No follow-ups on " file.    Anthony Silverman is a 34 year old, presenting for the following health issues:  Other (Update shots /Right elbow pain back in February )    HPI      Fell cross country ski. Fell back, elbow hit the ice. Sleeping on it hurts. No numbness.   Needs refills on medications / prep.         Objective    /86   Pulse 82   Temp 98.1  F (36.7  C) (Oral)   Resp 16   Wt 73.9 kg (163 lb)   SpO2 95%   BMI 25.15 kg/m    Body mass index is 25.15 kg/m .  Physical Exam   MSK: tenderness along the ulnar nerve on the dorsal side. No numbness or weakness. No pain along the medial epicondyle.       Signed Electronically by: Chidi Cantrell MD

## 2025-04-16 DIAGNOSIS — Z29.81 ENCOUNTER FOR PRE-EXPOSURE PROPHYLAXIS FOR HIV: ICD-10-CM

## 2025-04-16 RX ORDER — EMTRICITABINE AND TENOFOVIR DISOPROXIL FUMARATE 200; 300 MG/1; MG/1
1 TABLET, FILM COATED ORAL DAILY
Qty: 90 TABLET | Refills: 0 | Status: SHIPPED | OUTPATIENT
Start: 2025-04-16

## 2025-04-17 DIAGNOSIS — M25.521 ELBOW PAIN, RIGHT: Primary | ICD-10-CM

## 2025-04-22 ENCOUNTER — MYC REFILL (OUTPATIENT)
Dept: FAMILY MEDICINE | Facility: CLINIC | Age: 35
End: 2025-04-22
Payer: COMMERCIAL

## 2025-04-22 DIAGNOSIS — F31.81 BIPOLAR II DISORDER (H): ICD-10-CM

## 2025-04-22 RX ORDER — LAMOTRIGINE 150 MG/1
150 TABLET ORAL DAILY
Qty: 90 TABLET | Refills: 3 | Status: SHIPPED | OUTPATIENT
Start: 2025-04-22

## 2025-04-22 NOTE — TELEPHONE ENCOUNTER
lamoTRIgine (LAMICTAL) 150 MG tablet          Sig: Take 1 tablet (150 mg) by mouth daily.    Disp: 90 tablet    Refills: 3    Start: 4/22/2025    Class: E-Prescribe    Non-formulary For: Bipolar II disorder (H)    Last ordered: 5 months ago (11/13/2024) by Chidi Cantrell MD    Patient comment: we missed this one at my appt    Anti-Seizure Meds Protocol  [id number]/2025 07:38 AM   Protocol Details Review Authorizing provider's last note.    Has GFR on file in past 12 months and most recent value is normal    Medication indicated for associated diagnosis          Igor Werner, RN, MSN

## 2025-06-25 DIAGNOSIS — Z29.81 ENCOUNTER FOR PRE-EXPOSURE PROPHYLAXIS FOR HIV: Primary | ICD-10-CM

## 2025-07-10 ENCOUNTER — HOSPITAL ENCOUNTER (EMERGENCY)
Facility: CLINIC | Age: 35
Discharge: HOME | End: 2025-07-10
Attending: EMERGENCY MEDICINE | Admitting: EMERGENCY MEDICINE
Payer: COMMERCIAL

## 2025-07-10 ENCOUNTER — OFFICE VISIT (OUTPATIENT)
Dept: URGENT CARE | Facility: URGENT CARE | Age: 35
End: 2025-07-10
Payer: COMMERCIAL

## 2025-07-10 ENCOUNTER — APPOINTMENT (OUTPATIENT)
Dept: GENERAL RADIOLOGY | Facility: CLINIC | Age: 35
End: 2025-07-10
Attending: EMERGENCY MEDICINE
Payer: COMMERCIAL

## 2025-07-10 VITALS
SYSTOLIC BLOOD PRESSURE: 137 MMHG | BODY MASS INDEX: 25 KG/M2 | OXYGEN SATURATION: 100 % | TEMPERATURE: 98.1 F | WEIGHT: 162 LBS | DIASTOLIC BLOOD PRESSURE: 87 MMHG | RESPIRATION RATE: 18 BRPM | HEART RATE: 63 BPM

## 2025-07-10 VITALS
RESPIRATION RATE: 18 BRPM | SYSTOLIC BLOOD PRESSURE: 127 MMHG | TEMPERATURE: 97.9 F | HEART RATE: 55 BPM | OXYGEN SATURATION: 100 % | DIASTOLIC BLOOD PRESSURE: 85 MMHG

## 2025-07-10 DIAGNOSIS — R07.9 CHEST PAIN, UNSPECIFIED TYPE: ICD-10-CM

## 2025-07-10 DIAGNOSIS — R07.9 CHEST PAIN, UNSPECIFIED TYPE: Primary | ICD-10-CM

## 2025-07-10 LAB
ANION GAP SERPL CALCULATED.3IONS-SCNC: 12 MMOL/L (ref 7–15)
ATRIAL RATE - MUSE: 54 BPM
BASOPHILS # BLD AUTO: 0 10E3/UL (ref 0–0.2)
BASOPHILS NFR BLD AUTO: 1 %
BUN SERPL-MCNC: 14.1 MG/DL (ref 6–20)
CALCIUM SERPL-MCNC: 9.6 MG/DL (ref 8.8–10.4)
CHLORIDE SERPL-SCNC: 99 MMOL/L (ref 98–107)
CREAT SERPL-MCNC: 1.19 MG/DL (ref 0.67–1.17)
DIASTOLIC BLOOD PRESSURE - MUSE: NORMAL MMHG
EGFRCR SERPLBLD CKD-EPI 2021: 83 ML/MIN/1.73M2
EOSINOPHIL # BLD AUTO: 0.1 10E3/UL (ref 0–0.7)
EOSINOPHIL NFR BLD AUTO: 1 %
ERYTHROCYTE [DISTWIDTH] IN BLOOD BY AUTOMATED COUNT: 12.2 % (ref 10–15)
GLUCOSE SERPL-MCNC: 95 MG/DL (ref 70–99)
HCO3 SERPL-SCNC: 30 MMOL/L (ref 22–29)
HCT VFR BLD AUTO: 48.8 % (ref 40–53)
HGB BLD-MCNC: 16.9 G/DL (ref 13.3–17.7)
HOLD SPECIMEN: NORMAL
IMM GRANULOCYTES # BLD: 0 10E3/UL
IMM GRANULOCYTES NFR BLD: 0 %
INTERPRETATION ECG - MUSE: NORMAL
LYMPHOCYTES # BLD AUTO: 2.5 10E3/UL (ref 0.8–5.3)
LYMPHOCYTES NFR BLD AUTO: 50 %
MCH RBC QN AUTO: 32.2 PG (ref 26.5–33)
MCHC RBC AUTO-ENTMCNC: 34.6 G/DL (ref 31.5–36.5)
MCV RBC AUTO: 93 FL (ref 78–100)
MONOCYTES # BLD AUTO: 0.3 10E3/UL (ref 0–1.3)
MONOCYTES NFR BLD AUTO: 5 %
NEUTROPHILS # BLD AUTO: 2.2 10E3/UL (ref 1.6–8.3)
NEUTROPHILS NFR BLD AUTO: 43 %
NRBC # BLD AUTO: 0 10E3/UL
NRBC BLD AUTO-RTO: 0 /100
P AXIS - MUSE: 13 DEGREES
PLATELET # BLD AUTO: 267 10E3/UL (ref 150–450)
POTASSIUM SERPL-SCNC: 3.7 MMOL/L (ref 3.4–5.3)
PR INTERVAL - MUSE: 164 MS
QRS DURATION - MUSE: 118 MS
QT - MUSE: 412 MS
QTC - MUSE: 390 MS
R AXIS - MUSE: 69 DEGREES
RBC # BLD AUTO: 5.25 10E6/UL (ref 4.4–5.9)
SODIUM SERPL-SCNC: 141 MMOL/L (ref 135–145)
SYSTOLIC BLOOD PRESSURE - MUSE: NORMAL MMHG
T AXIS - MUSE: 32 DEGREES
TROPONIN T SERPL HS-MCNC: <6 NG/L
VENTRICULAR RATE- MUSE: 54 BPM
WBC # BLD AUTO: 5.1 10E3/UL (ref 4–11)

## 2025-07-10 PROCEDURE — 71046 X-RAY EXAM CHEST 2 VIEWS: CPT

## 2025-07-10 PROCEDURE — 99285 EMERGENCY DEPT VISIT HI MDM: CPT | Mod: 25

## 2025-07-10 PROCEDURE — 250N000011 HC RX IP 250 OP 636: Performed by: EMERGENCY MEDICINE

## 2025-07-10 PROCEDURE — 96374 THER/PROPH/DIAG INJ IV PUSH: CPT

## 2025-07-10 PROCEDURE — 36415 COLL VENOUS BLD VENIPUNCTURE: CPT | Performed by: EMERGENCY MEDICINE

## 2025-07-10 PROCEDURE — 80048 BASIC METABOLIC PNL TOTAL CA: CPT | Performed by: EMERGENCY MEDICINE

## 2025-07-10 PROCEDURE — 85004 AUTOMATED DIFF WBC COUNT: CPT | Performed by: EMERGENCY MEDICINE

## 2025-07-10 PROCEDURE — 99291 CRITICAL CARE FIRST HOUR: CPT | Mod: 25 | Performed by: EMERGENCY MEDICINE

## 2025-07-10 PROCEDURE — 84484 ASSAY OF TROPONIN QUANT: CPT | Performed by: EMERGENCY MEDICINE

## 2025-07-10 RX ORDER — KETOROLAC TROMETHAMINE 15 MG/ML
30 INJECTION, SOLUTION INTRAMUSCULAR; INTRAVENOUS ONCE
Status: COMPLETED | OUTPATIENT
Start: 2025-07-10 | End: 2025-07-10

## 2025-07-10 RX ADMIN — KETOROLAC TROMETHAMINE 30 MG: 15 INJECTION, SOLUTION INTRAMUSCULAR; INTRAVENOUS at 16:27

## 2025-07-10 ASSESSMENT — ACTIVITIES OF DAILY LIVING (ADL)
ADLS_ACUITY_SCORE: 41

## 2025-07-10 NOTE — PROGRESS NOTES
Urgent Care Clinic Visit    Chief Complaint   Patient presents with    Urgent Care    Chest Pain     L chest pain, night sweats(last night), painful to take deep breath, L arm is a little weak, started last night.                7/10/2025     1:04 PM   Additional Questions   Roomed by Dorita Conley   Accompanied by nobody

## 2025-07-10 NOTE — PROGRESS NOTES
Assessment & Plan     Chest pain    - EKG 12-lead complete w/read - Clinics    MDM  Chest pain with associated dyspnea and night sweats in the setting of emotional stress and no prior cardiac history. EKG shows sinus rhythm with right bundle branch block (RBBB), horizontal axis for age, and atypical T-wave axis concerning for possible acute process. Although anxiety may contribute, concerning features and abnormal EKG findings warrant further evaluation.    Patient was referred to the emergency room to rule out acute coronary syndrome:  further cardiac workup, including troponins and repeat EKG.        Patient Instructions   Follow up in the Emergency room         Patient was referred to the Emergency room. Patient understood and agreed to plan. Patient was stable for discharge.    Anthony Silverman is a 35 year old male who presents to clinic today  for the following health issues:  Chief Complaint   Patient presents with    Urgent Care    Chest Pain     L chest pain, night sweats(last night), painful to take deep breath, L arm is a little weak, started last night.      HPI    Patient reports onset of left-sided chest pain beginning last night. He describes associated shortness of breath and a sensation of weakness or abnormality on the left upper extremity. He reports worsening chest pain when taking deep breaths. He also experienced night sweats last night. He acknowledges high levels of stress related to work and personal life and suspects anxiety may be contributing, as he has had similar episodes in the past. However, he is concerned because the symptoms are not improving. He denies upper respiratory symptoms, cough, or a history of chest pain or cardiac disease.      Review of Systems   Cardiovascular:  Positive for chest pain.       Problem List:  2024-03: Attention deficit hyperactivity disorder (ADHD), unspecified   ADHD type  2024-03: Screening for human immunodeficiency virus  2024-03: Bipolar II  disorder (H)      Past Medical History:   Diagnosis Date    ADHD (attention deficit hyperactivity disorder)     Bipolar II disorder (H)        Social History     Tobacco Use    Smoking status: Never    Smokeless tobacco: Never   Substance Use Topics    Alcohol use: Not on file           Objective    /87   Pulse 63   Temp 98.1  F (36.7  C) (Tympanic)   Resp 18   Wt 73.5 kg (162 lb)   SpO2 100%   BMI 25.00 kg/m    Physical Exam  Constitutional:       Appearance: Normal appearance.   HENT:      Head: Normocephalic.   Cardiovascular:      Rate and Rhythm: Normal rate and regular rhythm.   Pulmonary:      Effort: Pulmonary effort is normal.      Breath sounds: Normal breath sounds.   Skin:     General: Skin is warm and dry.      Findings: No rash.   Neurological:      Mental Status: He is alert.   Psychiatric:         Mood and Affect: Mood normal.         Behavior: Behavior normal.              Leah Ball PA-C

## 2025-07-10 NOTE — DISCHARGE INSTRUCTIONS
Consider taking some ibuprofen or Tylenol as needed.  Activity as tolerated.  If you continue to have symptoms through the weekend, would recommend you follow-up with your doctor in the clinic next week.  If symptoms get significantly worse and you develop fevers etc., you can always return to the ER sooner for reevaluation.  Twice a day for 4 to 5 days.  Liquid antiacid as needed.

## 2025-07-10 NOTE — ED TRIAGE NOTES
Pt went to  for chest pain that started last night and persisted this morning, did EKG which read abnormal and sent here. Pt reports pain is worse on deep inspiration.

## 2025-07-10 NOTE — ED PROVIDER NOTES
Emergency Department Note      History of Present Illness     Chief Complaint   Chest Pain      HPI   Herrera Oliveros is a 35 year old male who presents for chest pain. Patient reports onset of left sided chest pain last night. His pain continued this morning. The pain comes on with deep inhalations. He also had some diaphoresis overnight which is not normal for him. He thought his pain may be related to indigestion or gas which he has had in the past, so he took Gas-X however this did not help. He had a normal bowel movement this morning.  His appetite has been normal today, no nausea. He was seen at urgent care where they did an EKG and was sent here for further evaluation. No shortness of breath. No abdominal pain.  No cough, recent cold symptoms. No recent trauma or injury to his chest. No history of blood clots. No recent travel. No calf pain or swelling. No cancer. No family history of blood clots. No immediate family history of MI. He had a few alcoholic drinks yesterday. Denies smoking.       Independent Historian   None    Review of External Notes   none    Past Medical History     Medical History and Problem List   ADHD  Bipolar 2 disorder       Medications   Atorvastatin   Lamictal  Truvada   Ritalin     Physical Exam     Patient Vitals for the past 24 hrs:   BP Temp Temp src Pulse Resp SpO2   07/10/25 1801 127/85 -- -- 55 18 100 %   07/10/25 1455 137/87 97.9  F (36.6  C) Temporal 59 18 100 %     Physical Exam  Nursing note and vitals reviewed.    Constitutional:  Appears comfortable.    Cardiovascular:  Normal rate, regular rhythm with normal S1 and S2.      Normal heart sounds and peripheral pulses 2+ and equal.       No murmur or jay.  Pulmonary:  Effort normal and breath sounds clear to auscultation bilaterally.    No respiratory distress.  No stridor.     No wheezes. No rales.     GI:    Soft. No distension and no mass. No epigastric tenderness.  Musculoskeletal:  Normal range of motion. No  extremity deformity.     No leg edema and no tenderness.  No reproducible chest wall tenderness.   Neurological:   Alert and oriented. No focal weakness.  Skin:    Skin is warm and dry. No rash noted.   Psychiatric:   Behavior is normal. Appropriate mood and affect.     Judgment and thought content normal.      Diagnostics     Lab Results   Labs Ordered and Resulted from Time of ED Arrival to Time of ED Departure   BASIC METABOLIC PANEL - Abnormal       Result Value    Sodium 141      Potassium 3.7      Chloride 99      Carbon Dioxide (CO2) 30 (*)     Anion Gap 12      Urea Nitrogen 14.1      Creatinine 1.19 (*)     GFR Estimate 83      Calcium 9.6      Glucose 95     TROPONIN T, HIGH SENSITIVITY - Normal    Troponin T, High Sensitivity <6     CBC WITH PLATELETS AND DIFFERENTIAL    WBC Count 5.1      RBC Count 5.25      Hemoglobin 16.9      Hematocrit 48.8      MCV 93      MCH 32.2      MCHC 34.6      RDW 12.2      Platelet Count 267      % Neutrophils 43      % Lymphocytes 50      % Monocytes 5      % Eosinophils 1      % Basophils 1      % Immature Granulocytes 0      NRBCs per 100 WBC 0      Absolute Neutrophils 2.2      Absolute Lymphocytes 2.5      Absolute Monocytes 0.3      Absolute Eosinophils 0.1      Absolute Basophils 0.0      Absolute Immature Granulocytes 0.0      Absolute NRBCs 0.0         Imaging   Chest XR,  PA & LAT   Final Result   IMPRESSION: No focal consolidation, pleural effusion or pneumothorax. Cardiomediastinal silhouette is unremarkable.          EKG   ECG results from 07/10/25   EKG 12-lead, tracing only     Value    Systolic Blood Pressure     Diastolic Blood Pressure     Ventricular Rate 54    Atrial Rate 54    WI Interval 164    QRS Duration 118        QTc 390    P Axis 13    R AXIS 69    T Axis 32    Interpretation ECG      Sinus bradycardia  Incomplete right bundle branch block  Borderline ECG  No previous ECGs available  Read by me            Independent Interpretation   CXR:  No mass or infiltrate.    ED Course      Medications Administered   Medications   ketorolac (TORADOL) injection 30 mg (30 mg Intravenous $Given 7/10/25 1629)       Procedures   Procedures     Discussion of Management   None    ED Course   ED Course as of 07/10/25 1805   Thu Jul 10, 2025   7155 I obtained history and examined the patient as noted above.        Additional Documentation  None    Medical Decision Making / Diagnosis     CMS Diagnoses: None    MIPS   None               MDM   Herrera Oliveros is a 35 year old male who has a history of these vague left-sided episodes of some mild chest tightness and feeling like is a little more difficult to breathe.  No asthma history.  He is PERC rule negative and has no risk factors for PE.  His vital signs are normal.  EKG shows an incomplete right bundle branch block but otherwise unremarkable.  Laboratory work came back with normal basic panel, troponin less than 6, CBC normal.  Chest x-ray was obtained and was completely normal.  It is possible that this could be esophageal with some mild reflux and esophagitis with the symptoms that he is having being caused by this.  I am going to recommend he try some omeprazole twice a day for 4 to 5 days and liquid antiacid as needed.  He is can to schedule follow-up appoint with his doctor next week for a recheck if not resolved.  He has no risk factors for coronary disease as well.    Consider taking some ibuprofen or Tylenol as needed.  Activity as tolerated.  If you continue to have symptoms through the weekend, would recommend you follow-up with your doctor in the clinic next week.  If symptoms get significantly worse and you develop fevers etc., you can always return to the ER sooner for reevaluation.  Omeprazole Twice a day for 4 to 5 days.  Liquid antiacid as needed.    Disposition   The patient was discharged.     Diagnosis     ICD-10-CM    1. Chest pain, unspecified type  R07.9            Discharge Medications    Discharge Medication List as of 7/10/2025  5:50 PM            Scribe Disclosure:  I, Jonna Madrid, am serving as a scribe at 4:04 PM on 7/10/2025 to document services personally performed by Lorraine Valenzuela MD based on my observations and the provider's statements to me.        Lorraine Valenzuela MD  07/10/25 1809

## 2025-07-22 ENCOUNTER — OFFICE VISIT (OUTPATIENT)
Dept: FAMILY MEDICINE | Facility: CLINIC | Age: 35
End: 2025-07-22
Payer: COMMERCIAL

## 2025-07-22 VITALS
DIASTOLIC BLOOD PRESSURE: 83 MMHG | HEART RATE: 48 BPM | TEMPERATURE: 98 F | WEIGHT: 161.2 LBS | SYSTOLIC BLOOD PRESSURE: 127 MMHG | OXYGEN SATURATION: 97 % | BODY MASS INDEX: 24.87 KG/M2 | RESPIRATION RATE: 16 BRPM

## 2025-07-22 DIAGNOSIS — Z29.81 ENCOUNTER FOR PRE-EXPOSURE PROPHYLAXIS FOR HIV: ICD-10-CM

## 2025-07-22 DIAGNOSIS — F90.9 ATTENTION DEFICIT HYPERACTIVITY DISORDER (ADHD), UNSPECIFIED ADHD TYPE: Primary | ICD-10-CM

## 2025-07-22 DIAGNOSIS — Z00.00 HEALTHCARE MAINTENANCE: ICD-10-CM

## 2025-07-22 LAB
HIV 1+2 AB+HIV1 P24 AG SERPL QL IA: NONREACTIVE
T PALLIDUM AB SER QL: REACTIVE

## 2025-07-22 RX ORDER — DOXYCYCLINE 100 MG/1
200 CAPSULE ORAL PRN
Qty: 20 CAPSULE | Refills: 0 | Status: SHIPPED | OUTPATIENT
Start: 2025-07-22

## 2025-07-22 RX ORDER — METHYLPHENIDATE HYDROCHLORIDE 20 MG/1
20 TABLET ORAL 2 TIMES DAILY
Qty: 60 TABLET | Refills: 0 | Status: SHIPPED | OUTPATIENT
Start: 2025-08-21 | End: 2025-09-20

## 2025-07-22 RX ORDER — EMTRICITABINE AND TENOFOVIR DISOPROXIL FUMARATE 200; 300 MG/1; MG/1
1 TABLET, FILM COATED ORAL DAILY
Qty: 90 TABLET | Refills: 0 | Status: SHIPPED | OUTPATIENT
Start: 2025-07-22

## 2025-07-22 RX ORDER — METHYLPHENIDATE HYDROCHLORIDE 20 MG/1
20 TABLET ORAL 2 TIMES DAILY
Qty: 60 TABLET | Refills: 0 | Status: SHIPPED | OUTPATIENT
Start: 2025-07-22 | End: 2025-08-21

## 2025-07-22 RX ORDER — METHYLPHENIDATE HYDROCHLORIDE 20 MG/1
20 TABLET ORAL 2 TIMES DAILY
Qty: 60 TABLET | Refills: 0 | Status: SHIPPED | OUTPATIENT
Start: 2025-09-20 | End: 2025-10-20

## 2025-07-22 NOTE — PROGRESS NOTES
Preceptor Attestation:    I discussed the patient with the resident and evaluated the patient in person. I have verified the content of the note, which accurately reflects my assessment of the patient and the plan of care.   Supervising Physician:  Sean Carreon MD.

## 2025-07-22 NOTE — PROGRESS NOTES
Assessment & Plan     Healthcare maintenance  Refill of post-exposure prophylaxis for STI prevention   - doxycycline hyclate (VIBRAMYCIN) 100 MG capsule  Dispense: 20 capsule; Refill: 0    Encounter for pre-exposure prophylaxis for HIV  Labs ordered, treated for syphilis previously   - emtricitabine-tenofovir (TRUVADA) 200-300 MG per tablet  Dispense: 90 tablet; Refill: 0  - HIV Antigen Antibody Combo Cascade  - Treponema Abs w Reflex to RPR and Titer  - Chlamydia trachomatis/Neisseria gonorrhoeae by PCR  - Chlamydia trachomatis/Neisseria gonorrhoeae by PCR  - Chlamydia trachomatis/Neisseria gonorrhoeae by PCR    Attention deficit hyperactivity disorder (ADHD), unspecified ADHD type  Stable, refills ordered  - methylphenidate (RITALIN) 20 MG tablet  Dispense: 60 tablet; Refill: 0  - methylphenidate (RITALIN) 20 MG tablet  Dispense: 60 tablet; Refill: 0  - methylphenidate (RITALIN) 20 MG tablet  Dispense: 60 tablet; Refill: 0    Anthony Silverman is a 35 year old, presenting for the following health issues:  Other (Prep labs )      7/22/2025     8:01 AM   Additional Questions   Roomed by Angelique   Accompanied by self         7/22/2025    Information    services provided? No     HPI      Patient here for refill of medications and for lab monitoring for PrEP.  He has no other questions or concerns at this time.       Objective    /83   Pulse (!) 48   Temp 98  F (36.7  C) (Oral)   Resp 16   Wt 73.1 kg (161 lb 3.2 oz)   SpO2 97%   BMI 24.87 kg/m    Body mass index is 24.87 kg/m .  Physical Exam   No physical exam completed today. Patient is here for largely medication management, refills and or counseling.         Signed Electronically by: Chidi Cantrell MD

## 2025-07-26 LAB — T PALLIDUM AB SER QL AGGL: REACTIVE
